# Patient Record
Sex: FEMALE | Race: WHITE
[De-identification: names, ages, dates, MRNs, and addresses within clinical notes are randomized per-mention and may not be internally consistent; named-entity substitution may affect disease eponyms.]

---

## 2021-09-15 ENCOUNTER — HOSPITAL ENCOUNTER (EMERGENCY)
Dept: HOSPITAL 41 - JD.ED | Age: 76
Discharge: NURSING, REHAB OR LONG-TERM CARE | End: 2021-09-15
Payer: MEDICARE

## 2021-09-15 DIAGNOSIS — K56.2: Primary | ICD-10-CM

## 2021-09-15 DIAGNOSIS — Z88.1: ICD-10-CM

## 2021-09-15 DIAGNOSIS — Z88.0: ICD-10-CM

## 2021-09-15 DIAGNOSIS — Z20.822: ICD-10-CM

## 2021-09-15 DIAGNOSIS — Z79.82: ICD-10-CM

## 2021-09-15 DIAGNOSIS — Z79.899: ICD-10-CM

## 2021-09-15 PROCEDURE — U0002 COVID-19 LAB TEST NON-CDC: HCPCS

## 2021-09-15 NOTE — EDM.PDOC
ED HPI GENERAL MEDICAL PROBLEM





- General


Chief Complaint: Abdominal Pain


Stated Complaint: [facility]


Time Seen by Provider: 09/15/21 09:08


Source of Information: Reports: Patient, EMS


History Limitations: Reports: No Limitations





- History of Present Illness


INITIAL COMMENTS - FREE TEXT/NARRATIVE: 





The patient presents by [facility] with intercept by [facility] 

for lower abdominal pain.  The patient said she woke up feeling fine.  She went 

to the bathroom and started having dysuria and then she developed lower 

abdominal pain.  She has no nausea or vomiting.  She has some diarrhea but that 

is normal for her.  She has no fever, chills, cough, chest pain, or shortness of

breath.  She still has her appendix and gallbladder.  EMS gave her 25mcg IV of f

entanyl.


Onset: Sudden


Duration: Hour(s):


Location: Reports: Abdomen


Quality: Reports: Sharp


Severity: Moderate


Improves with: Reports: None


Worsens with: Reports: None


Associated Symptoms: Reports: No Other Symptoms


  ** Bilateral Lower Abdomen


Pain Score (Numeric/FACES): 6





- Related Data


                                    Allergies











Allergy/AdvReac Type Severity Reaction Status Date / Time


 


cephalexin [From Keflex] Allergy Severe Facial Verified 09/15/21 09:19





   Swelling  


 


Penicillins Allergy Intermediate Hives Verified 09/15/21 11:35











Home Meds: 


                                    Home Meds





Aspirin [Halfprin] 81 mg PO QAM 09/15/21 [History]


Budesonide [Budesonide EC] 9 mg PO QAM 09/15/21 [History]


Calcium Carbonate [Calcium] 500 mg PO QAM 09/15/21 [History]


Cholecalciferol (Vitamin D3) [Vitamin D3] 6,000 units PO QAM 09/15/21 [History]


Latanoprost 1 drop EYEBOTH QA 09/15/21 [History]


Olmesartan Medoxomil [Benicar] 20 mg PO QAM 09/15/21 [History]


Simvastatin 40 mg PO QPM 09/15/21 [History]


Timolol [Betimol] 1 drop EYEBOTH QA 09/15/21 [History]


Vitamin B Complex 1 cap PO QAM 09/15/21 [History]











ED ROS GENERAL





- Review of Systems


Review Of Systems: See Below


Constitutional: Reports: No Symptoms


HEENT: Reports: No Symptoms


Respiratory: Reports: No Symptoms


Cardiovascular: Reports: No Symptoms


Endocrine: Reports: No Symptoms


GI/Abdominal: Reports: Abdominal Pain, Diarrhea.  Denies: Nausea, Vomiting


: Reports: No Symptoms


Musculoskeletal: Reports: No Symptoms





ED EXAM, GI/ABD





- Physical Exam


Exam: See Below


Exam Limited By: No Limitations


General Appearance: Alert, No Apparent Distress


Ears: Normal External Exam


Nose: Normal Inspection


Head: Atraumatic, Normocephalic


Neck: Normal Inspection


Respiratory/Chest: No Respiratory Distress, Lungs Clear, Normal Breath Sounds


Cardiovascular: Regular Rate, Rhythm, No Edema, No Murmur


GI/Abdominal Exam: Soft, No Organomegaly, No Mass, Tender (Moderate generalized 

tenderness)





Course





- Vital Signs


Last Recorded V/S: 


                                Last Vital Signs











Temp  96.9 F   09/15/21 09:10


 


Pulse  73   09/15/21 09:10


 


Resp  18   09/15/21 09:10


 


BP  177/85 H  09/15/21 09:10


 


Pulse Ox  98   09/15/21 09:10














- Orders/Labs/Meds


Orders: 


                               Active Orders 24 hr











 Category Date Time Status


 


 Peripheral IV Care [RC] .AS DIRECTED Care  09/15/21 09:17 Active


 


 CORONAVIRUS COVID-19 KATHI [MOLEC] Stat Lab  09/15/21 12:09 Ordered


 


 CULTURE URINE [MREF] Stat Lab  09/15/21 11:50 Ordered


 


 Levofloxacin/Dextrose 5%-Water [Levaquin in D5W 500 MG/ Med  09/15/21 12:00 

Active





 100 ML] 500 mg   





 Premix Bag 1 bag   





 IV ONETIME   


 


 Sodium Chloride 0.9% [Normal Saline] 1,000 ml Med  09/15/21 09:30 Active





 IV ASDIRECTED   


 


 Sodium Chloride 0.9% [Saline Flush] Med  09/15/21 09:17 Active





 10 ml FLUSH ASDIRECTED PRN   


 


 Sodium Chloride 0.9% [Saline Flush] Med  09/15/21 09:26 Active





 10 ml FLUSH ONETIME PRN   


 


 metroNIDAZOLE/Normal Saline [Flagyl in  MG/100 ML Med  09/15/21 12:00 

Active





 ] 500 mg   





 Premix Bag 1 bag   





 IV ONETIME   


 


 ED Antiemetic Medication Reflex [OM.PC] Stat Oth  09/15/21 09:17 Ordered


 


 Peripheral IV Insertion Adult [OM.PC] Stat Oth  09/15/21 09:17 Ordered








                                Medication Orders





Sodium Chloride (Normal Saline)  1,000 mls @ 125 mls/hr IV ASDIRECTED LIZANDRO


   Last Admin: 09/15/21 09:30  Dose: 125 mls/hr


   Documented by: SANTOSH


Metronidazole 500 mg/ Premix  100 mls @ 100 mls/hr IV ONETIME ONE


   Stop: 09/15/21 12:59


Levofloxacin/Dextrose 500 mg/ (Premix)  100 mls @ 100 mls/hr IV ONETIME ONE


   Stop: 09/15/21 12:59


Sodium Chloride (Sodium Chloride 0.9% 10 Ml Syringe)  10 ml FLUSH ASDIRECTED PRN


   PRN Reason: Keep Vein Open


   Last Admin: 09/15/21 09:31  Dose: 10 ml


   Documented by: ERNESTO


Sodium Chloride (Sodium Chloride 0.9% 10 Ml Syringe)  10 ml FLUSH ONETIME PRN


   PRN Reason: IV FLUSH


   Last Admin: 09/15/21 11:09  Dose: 10 ml


   Documented by: MILAGROS








Labs: 


                                Laboratory Tests











  09/15/21 09/15/21 09/15/21 Range/Units





  09:45 10:20 10:20 


 


WBC   6.85   (3.98-10.04)  K/mm3


 


RBC   4.13   (3.98-5.22)  M/mm3


 


Hgb   13.3   (11.2-15.7)  gm/dl


 


Hct   41.3   (34.1-44.9)  %


 


MCV   100.0 H   (79.4-94.8)  fl


 


MCH   32.2   (25.6-32.2)  pg


 


MCHC   32.2   (32.2-35.5)  g/dl


 


RDW Std Deviation   54.3 H   (36.4-46.3)  fL


 


Plt Count   241   (182-369)  K/mm3


 


MPV   9.1 L   (9.4-12.3)  fl


 


Neut % (Auto)   82.5 H   (34.0-71.1)  %


 


Lymph % (Auto)   14.5 L   (19.3-51.7)  %


 


Mono % (Auto)   2.6 L   (4.7-12.5)  %


 


Eos % (Auto)   0.3 L   (0.7-5.8)  


 


Baso % (Auto)   0.0 L   (0.1-1.2)  %


 


Neut # (Auto)   5.65   (1.56-6.13)  K/mm3


 


Lymph # (Auto)   0.99 L   (1.18-3.74)  K/mm3


 


Mono # (Auto)   0.18 L   (0.24-0.36)  K/mm3


 


Eos # (Auto)   0.02 L   (0.04-0.36)  K/mm3


 


Baso # (Auto)   0.00 L   (0.01-0.08)  K/mm3


 


Sodium    139  (136-145)  mEq/L


 


Potassium    3.1 L  (3.5-5.1)  mEq/L


 


Chloride    105  ()  mEq/L


 


Carbon Dioxide    20 L  (21-32)  mEq/L


 


Anion Gap    17.1 H  (5-15)  


 


BUN    16  (7-18)  mg/dL


 


Creatinine    0.7  (0.55-1.02)  mg/dL


 


Est Cr Clr Drug Dosing    53.21  mL/min


 


Estimated GFR (MDRD)    > 60  (>60)  mL/min


 


BUN/Creatinine Ratio    22.9 H  (14-18)  


 


Glucose    95  (70-99)  mg/dL


 


Calcium    8.7  (8.5-10.1)  mg/dL


 


Total Bilirubin    0.7  (0.2-1.0)  mg/dL


 


AST    24  (15-37)  U/L


 


ALT    45  (14-59)  U/L


 


Alkaline Phosphatase    73  ()  U/L


 


Total Protein    6.6  (6.4-8.2)  g/dl


 


Albumin    3.6  (3.4-5.0)  g/dl


 


Globulin    3.0  gm/dL


 


Albumin/Globulin Ratio    1.2  (1-2)  


 


Lipase    93  ()  U/L


 


Urine Color  Zenia H    (Yellow)  


 


Urine Appearance  Cloudy H    (Clear)  


 


Urine pH  6.0    (5.0-8.0)  


 


Ur Specific Gravity  1.020    (1.005-1.030)  


 


Urine Protein  2+ H    (Negative)  


 


Urine Glucose (UA)  Negative    (Negative)  


 


Urine Ketones  Trace H    (Negative)  


 


Urine Occult Blood  Trace-intact H    (Negative)  


 


Urine Nitrite  Positive H    (Negative)  


 


Urine Bilirubin  1+ H    (Negative)  


 


Urine Urobilinogen  1.0    (0.2-1.0)  


 


Ur Leukocyte Esterase  2+ H    (Negative)  


 


Urine RBC  0-5    (0-5)  /hpf


 


Urine WBC  >100 H    (0-5)  /hpf


 


Ur Epithelial Cells  0-5    (0-5)  /hpf


 


Urine Bacteria  Few    (FEW)  /hpf


 


Urine Mucus  Few    (FEW)  /hpf











Meds: 


Medications











Generic Name Dose Route Start Last Admin





  Trade Name Leah  PRN Reason Stop Dose Admin


 


Sodium Chloride  1,000 mls @ 125 mls/hr  09/15/21 09:30  09/15/21 09:30





  Normal Saline  IV   125 mls/hr





  ASDIRECTED LIZANDRO   Administration


 


Metronidazole 500 mg/ Premix  100 mls @ 100 mls/hr  09/15/21 12:00 





  IV  09/15/21 12:59 





  ONETIME ONE  


 


Levofloxacin/Dextrose 500 mg/  100 mls @ 100 mls/hr  09/15/21 12:00 





  Premix  IV  09/15/21 12:59 





  ONETIME ONE  


 


Sodium Chloride  10 ml  09/15/21 09:17  09/15/21 09:31





  Sodium Chloride 0.9% 10 Ml Syringe  FLUSH   10 ml





  ASDIRECTED PRN   Administration





  Keep Vein Open  


 


Sodium Chloride  10 ml  09/15/21 09:26  09/15/21 11:09





  Sodium Chloride 0.9% 10 Ml Syringe  FLUSH   10 ml





  ONETIME PRN   Administration





  IV FLUSH  














Discontinued Medications














Generic Name Dose Route Start Last Admin





  Trade Name Leah  PRN Reason Stop Dose Admin


 


Diatrizoate Meglum/Diatrizoate Sod  120 ml  09/15/21 09:26  09/15/21 11:09





  Diatrizoate Meglumine/Diatrizoate Sodium 37% 120 Ml Bottle  PO  09/15/21 09:27

  30 ml





  ONETIME ONE   Administration


 


Hydromorphone HCl  0.5 mg  09/15/21 09:18  09/15/21 09:31





  Hydromorphone 0.5 Mg/0.5 Ml Syringe  IVPUSH  09/15/21 09:19  0.5 mg





  ONETIME ONE   Administration


 


Hydromorphone HCl  0.5 mg  09/15/21 12:07 





  Hydromorphone 0.5 Mg/0.5 Ml Syringe  IVPUSH  09/15/21 12:08 





  ONETIME ONE  


 


Iopamidol  100 ml  09/15/21 09:26  09/15/21 11:09





  Iopamidol 612 Mg/Ml 100 Ml Bottle  IVPUSH  09/15/21 09:27  100 ml





  ONETIME ONE   Administration


 


Ondansetron HCl  4 mg  09/15/21 09:17  09/15/21 09:31





  Ondansetron 4 Mg/2 Ml Sdv  IVPUSH  09/15/21 09:18  4 mg





  ONETIME ONE   Administration














- Re-Assessments/Exams


Free Text/Narrative Re-Assessment/Exam: 





09/15/21 10:00


I ordered an IV NS at 125mL/hr, zofran 4mg IV, dilaudid 0.5mg IV, labs, UA and a

 CT of her abdomen and pelvis with IV and oral contrast.


09/15/21 11:54


Her CBC looks good.  Her K was low at 3.1.  Her anion gap was elevated at 17.1. 

 Her lipase was negative.  Her UA shows a UTI.


09/15/21 12:09


Her CT shows small amount of free air underneath the right hemidiaphragm.  Gas 

dilated cecum suspicious for cecal volvulus.  This may be the etiology of the 

free air.  Dilated small bowel loops are seen.  Diffuse wall thickening is seen 

within the duodenum and jejunum which could be vascular in etiology versus 

change from infection.





I am told we have no beds right now and cannot accept the patient.  I called [facility] in [place] and talked with Dr Joaquin the on call surgeon and she 

accepted the patient.  She did want levaquin and flagyl started.  I have ordered

 them.





Departure





- Departure


Time of Disposition: 12:20


Disposition: DC/[facility]fer to [facility]


Condition: Serious


Clinical Impression: 


 Cecal volvulus, Perforated abdominal viscus








- Discharge Information


Referrals: 


Ninfa Dodson MD [Primary Care Provider] - 


Forms:  ED Department Discharge





Sepsis Event Note (ED)





- Evaluation


Sepsis Screening Result: No Definite Risk





- Focused Exam


Vital Signs: 


                                   Vital Signs











  Temp Pulse Resp BP Pulse Ox


 


 09/15/21 09:10  96.9 F  73  18  177/85 H  98














- My Orders


Last 24 Hours: 


My Active Orders





09/15/21 09:17


Peripheral IV Care [RC] .AS DIRECTED 


Sodium Chloride 0.9% [Saline Flush]   10 ml FLUSH ASDIRECTED PRN 


ED Antiemetic Medication Reflex [OM.PC] Stat 


Peripheral IV Insertion Adult [OM.PC] Stat 





09/15/21 09:26


Sodium Chloride 0.9% [Saline Flush]   10 ml FLUSH ONETIME PRN 





09/15/21 09:30


Sodium Chloride 0.9% [Normal Saline] 1,000 ml IV ASDIRECTED 





09/15/21 11:50


CULTURE URINE [MREF] Stat 





09/15/21 12:00


Levofloxacin/Dextrose 5%-Water [Levaquin in D5W 500 MG/100 ML] 500 mg   Premix 

Bag 1 bag IV ONETIME 


metroNIDAZOLE/Normal Saline [Flagyl in  MG/100 ML] 500 mg   Premix Bag 1 

bag IV ONETIME 





09/15/21 12:09


CORONAVIRUS COVID-19 KATHI [MOLEC] Stat 














- Assessment/Plan


Last 24 Hours: 


My Active Orders





09/15/21 09:17


Peripheral IV Care [RC] .AS DIRECTED 


Sodium Chloride 0.9% [Saline Flush]   10 ml FLUSH ASDIRECTED PRN 


ED Antiemetic Medication Reflex [OM.PC] Stat 


Peripheral IV Insertion Adult [OM.PC] Stat 





09/15/21 09:26


Sodium Chloride 0.9% [Saline Flush]   10 ml FLUSH ONETIME PRN 





09/15/21 09:30


Sodium Chloride 0.9% [Normal Saline] 1,000 ml IV ASDIRECTED 





09/15/21 11:50


CULTURE URINE [MREF] Stat 





09/15/21 12:00


Levofloxacin/Dextrose 5%-Water [Levaquin in D5W 500 MG/100 ML] 500 mg   Premix 

Bag 1 bag IV ONETIME 


metroNIDAZOLE/Normal Saline [Flagyl in  MG/100 ML] 500 mg   Premix Bag 1 

bag IV ONETIME 





09/15/21 12:09


CORONAVIRUS COVID-19 KATHI [MOLEC] Stat

## 2021-09-15 NOTE — CT
CT abdomen and pelvis

 

Technique: Multiple axial sections were obtained from above the dome 

of the diaphragm inferiorly through the pubic symphysis.  Intravenous 

and oral contrast has been given.  Delayed images were obtained 

through the pelvis.  Reconstructed coronal and sagittal images were 

obtained.

 

Comparison: No prior CT abdomen or pelvis study is available.

 

Findings: There is a mild amount of free air being seen beneath the 

hemidiaphragm on the right side.

 

Visualized lung bases show nothing acute.

 

Liver contains no focal parenchymal abnormality.  Contrast is noted 

within the distal esophagus compatible with reflux.  Spleen size is 

normal.  Adrenal glands show no nodule.  Kidneys show symmetric 

contrast enhancement.  Very small low density finding is noted within 

the left kidney measuring 5 mm which is most likely due to a small 

cyst.  Abdominal aorta shows atherosclerotic change with no aneurysm. 

 There is opacification of the celiac axis and superior mesenteric 

arteries without any evidence of focal occlusion.  Inferior mesenteric

 artery is also patent.  No retroperitoneal adenopathy is seen.  No 

pelvic mass or adenopathy is seen.

 

Pancreas appears within normal limits.

 

There is prominent bowel wall thickening being seen within the 

duodenum and jejunum.  More distal jejunum and ileum show mild 

dilatation.  There is air being seen within the wall of the thickened 

jejunum compatible with disruption of the bowel wall.

 

Cecum lies within the right upper abdomen.  Slight inflammatory change

 is seen.  Findings are suggestive of a mild cecal volvulus.  Cecum is

 gas-filled and dilated up to 8.3 cm.  Appendix is not visualized.  

There may be perforation of the cecum given the free air.

 

Diffuse diverticuli are seen throughout the colon without findings of 

diverticulitis.

 

Delayed images show contrast within the distal ureters and bladder.  

Right hip pinning is noted.  Mild degenerative change is seen within 

the spine most prominent at L3-4.

 

Impression:

1.  Small amount of free air underneath the right hemidiaphragm.

2.  Gas dilated cecum suspicious for cecal volvulus.  This may be the 

etiology of the free air.

3.  Dilated small bowel loops are seen.  Diffuse wall thickening is 

seen within the duodenum and jejunum which could be vascular in 

etiology versus change from infection.

 

Diagnostic code #5

## 2022-01-07 ENCOUNTER — HOSPITAL ENCOUNTER (INPATIENT)
Dept: HOSPITAL 41 - JD.MS | Age: 77
LOS: 3 days | Discharge: HOME | DRG: 641 | End: 2022-01-10
Attending: SURGERY | Admitting: SURGERY
Payer: MEDICARE

## 2022-01-07 DIAGNOSIS — N17.9: ICD-10-CM

## 2022-01-07 DIAGNOSIS — E86.0: Primary | ICD-10-CM

## 2022-01-07 DIAGNOSIS — Z87.891: ICD-10-CM

## 2022-01-07 DIAGNOSIS — Z88.0: ICD-10-CM

## 2022-01-07 DIAGNOSIS — Z93.3: ICD-10-CM

## 2022-01-07 DIAGNOSIS — Z98.51: ICD-10-CM

## 2022-01-07 DIAGNOSIS — Z20.822: ICD-10-CM

## 2022-01-07 DIAGNOSIS — F32.A: ICD-10-CM

## 2022-01-07 DIAGNOSIS — K57.90: ICD-10-CM

## 2022-01-07 DIAGNOSIS — H40.9: ICD-10-CM

## 2022-01-07 DIAGNOSIS — Z98.49: ICD-10-CM

## 2022-01-07 DIAGNOSIS — Z88.1: ICD-10-CM

## 2022-01-07 DIAGNOSIS — Z79.82: ICD-10-CM

## 2022-01-07 DIAGNOSIS — Z79.899: ICD-10-CM

## 2022-01-07 PROCEDURE — U0002 COVID-19 LAB TEST NON-CDC: HCPCS

## 2022-01-07 NOTE — PCM.HP.2
H&P History of Present Illness





- General


Date of Service: 01/07/22


Admit Problem/Dx: 


                           Admission Diagnosis/Problem





Admission Diagnosis/Problem      Dehydration








Source of Information: Patient


History Limitations: Reports: No Limitations





- History of Present Illness


Initial Comments - Free Text/Narative: 





The patient was seen in my office today for a routine visit for colostomy rever

sal. She complained that she has been fatigues. [place] 

I obtained CBC and chemistry. She reported that her colostomy output has been 

liquid for a few days. No fevers or chills.


CMP : Glu 151, BUN 37, Creat 2.35, Na 132, K 5.2, Cl 102, CO2 15, AG 22, Calc 

11.3, Tprot 7.9, Alb 4.8, Ast 66, ALT 86, Tbili 0.6.


CBC: WBC 10.2, Hgb 16.5, Plat 125





Looking back in Sept 30, 2021, her creat was 0.66.





Due to DAYANARA and dehydration i asked that she be admitted for hydration.





Onset of Symptoms: Reports: Gradual


Duration of Symptoms: Reports: Day(s): (7)


Location: Reports: Abdomen


Improves with: Reports: None


Worsens with: Reports: None


Context: Reports: Sick Contact


Associated Symptoms: Reports: Loss of Appetite





- Related Data


Allergies/Adverse Reactions: 


                                    Allergies











Allergy/AdvReac Type Severity Reaction Status Date / Time


 


cephalexin [From Keflex] Allergy Severe Facial Verified 09/15/21 09:19





   Swelling  


 


Penicillins Allergy Intermediate Hives Verified 09/15/21 11:35











Home Medications: 


                                    Home Meds





Aspirin [Halfprin] 81 mg PO QAM 09/15/21 [History]


Budesonide [Budesonide EC] 9 mg PO QAM 09/15/21 [History]


Calcium Carbonate [Calcium] 500 mg PO QAM 09/15/21 [History]


Cholecalciferol (Vitamin D3) [Vitamin D3] 6,000 units PO QAM 09/15/21 [History]


Latanoprost 1 drop EYEBOTH QAM 09/15/21 [History]


Olmesartan Medoxomil [Benicar] 20 mg PO QAM 09/15/21 [History]


Simvastatin 40 mg PO QPM 09/15/21 [History]


Timolol [Betimol 0.25% O/S 5 ML] 1 drop EYEBOTH QAM 09/15/21 [History]


Vitamin B Complex 1 cap PO [facility] 09/15/21 [History]











Past Medical History


HEENT History: Reports: Cataract, Glaucoma


Gastrointestinal History: Reports: Diverticulosis, Inflammatory Bowel Disease


Musculoskeletal History: Reports: None


Psychiatric History: Reports: Depression


Other Psychiatric History: Hx of it years ago


Hematologic History: Reports: Anemia





- Infectious Disease History


Infectious Disease History: Reports: Chicken Pox, Measles





- Past Surgical History


HEENT Surgical History: Reports: Cataract Surgery, Laser Surgery


Other HEENT Surgeries/Procedures: Cataract and glaucoma surgeries [id number]


GI Surgical History: Reports: Colostomy


Other GI Surgeries/Procedures: Ostomy placed Sept 2021


Female  Surgical History: Reports: Tubal Ligation


Other Female  Surgeries/Procedures: 30 plus years ago


Other Musculoskeletal Surgeries/Procedures:: Right femur fracture with brigitte 

placement 2015





Social & Family History





- Family History


HEENT: Reports: None


Cardiac: Reports: Hypertension


Other Cardiac Family History: Mother had multiple stroke and HTN. Father had HTN





- Tobacco Use


Tobacco Use Status *Q: Former Tobacco User


Years of Tobacco use: 30


Used Tobacco, but Quit: Yes


Month/Year Tobacco Last Used: 6 yrs ago


Second Hand Smoke Exposure: No





- Caffeine Use


Caffeine Use: Reports: Coffee





- Alcohol Use


Days Per Week of Alcohol Use: 7


Number of Drinks Per Day: 1


Total Drinks Per Week: 7


Date of Last Drink: 01/06/22


Time of Last Drink: 21:00





- Recreational Drug Use


Recreational Drug Use: No





H&P Review of Systems





- Review of Systems:


Review Of Systems: See Below


General: Reports: No Symptoms


HEENT: Reports: No Symptoms


Pulmonary: Reports: No Symptoms


Cardiovascular: Reports: No Symptoms


Gastrointestinal: Reports: No Symptoms


Genitourinary: Reports: No Symptoms


Musculoskeletal: Reports: No Symptoms


Skin: Reports: No Symptoms


Psychiatric: Reports: No Symptoms


Review of Systems Comment:: 





Fatigue





Exam





- Exam


Exam: See Below





- Vital Signs


Vital Signs: 


                                Last Vital Signs











Temp  97.5 F   01/07/22 15:46


 


Pulse  102 H  01/07/22 15:46


 


Resp  12   01/07/22 15:46


 


BP  120/40 L  01/07/22 15:46


 


Pulse Ox  99   01/07/22 15:46











Weight: 60.6 kg





- Exam


General: Alert, Oriented, Cooperative


Lungs: Clear to Auscultation, Normal Respiratory Effort


Cardiovascular: Regular Rhythm, Normal S1, Normal S2, Tachycardia


GI/Abdominal Exam: Normal Bowel Sounds, Soft, Non-Tender





Sepsis Event Note





- Focused Exam


Vital Signs: 


                                   Vital Signs











  Temp Pulse Resp BP Pulse Ox


 


 01/07/22 15:46  97.5 F  102 H  12  120/40 L  99











Problem List Initiated/Reviewed/Updated: No


Orders Last 24hrs: 


                               Active Orders 24 hr











 Category Date Time Status


 


 Patient Status [ADT] Routine ADT  01/07/22 16:18 Ordered


 


 Antiembolic Devices [RC] PER UNIT ROUTINE Care  01/07/22 16:30 Ordered


 


 Intake and Output [RC] Q4HR Care  01/07/22 16:20 Ordered


 


 Oxygen Therapy [RC] PRN Care  01/07/22 16:18 Ordered


 


 Up ad Vianney [RC] ASDIRECTED Care  01/07/22 16:18 Ordered


 


 VTE/DVT Education [RC] PER UNIT ROUTINE Care  01/07/22 16:18 Ordered


 


 Vital Signs [RC] Q4H Care  01/07/22 16:18 Ordered


 


 Regular Diet [DIET] Diet  01/07/22 Dinner Ordered


 


 BASIC METABOLIC PANEL,BMP [CHEM] AM Lab  01/08/22 05:11 Ordered


 


 BASIC METABOLIC PANEL,BMP [CHEM] AM Lab  01/09/22 05:11 Ordered


 


 BASIC METABOLIC PANEL,BMP [CHEM] AM Lab  01/10/22 05:11 Ordered


 


 BASIC METABOLIC PANEL,BMP [CHEM] AM Lab  01/11/22 05:11 Ordered


 


 BASIC METABOLIC PANEL,BMP [CHEM] AM Lab  01/12/22 05:11 Ordered


 


 CBC WITH AUTO DIFF [HEME] AM Lab  01/08/22 05:11 Ordered


 


 CBC WITH AUTO DIFF [HEME] AM Lab  01/09/22 05:11 Ordered


 


 CBC WITH AUTO DIFF [HEME] AM Lab  01/10/22 05:11 Ordered


 


 CORONAVIRUS COVID-19 KATHI [MOLEC] Stat Lab  01/07/22 16:25 Ordered


 


 MAGNESIUM [CHEM] AM Lab  01/08/22 05:11 Ordered


 


 MAGNESIUM [CHEM] AM Lab  01/09/22 05:11 Ordered


 


 MAGNESIUM [CHEM] AM Lab  01/10/22 05:11 Ordered


 


 MAGNESIUM [CHEM] AM Lab  01/11/22 05:11 Ordered


 


 MAGNESIUM [CHEM] AM Lab  01/12/22 05:11 Ordered


 


 PHOSPHORUS [CHEM] AM Lab  01/08/22 05:11 Ordered


 


 PHOSPHORUS [CHEM] AM Lab  01/09/22 05:11 Ordered


 


 PHOSPHORUS [CHEM] AM Lab  01/10/22 05:11 Ordered


 


 PHOSPHORUS [CHEM] AM Lab  01/11/22 05:11 Ordered


 


 PHOSPHORUS [CHEM] AM Lab  01/12/22 05:11 Ordered


 


 Acetaminophen [TylenoL] Med  01/07/22 16:25 Ordered





 650 mg PO Q4H PRN   


 


 Docusate Sodium [Colace] Med  01/07/22 16:25 Ordered





 100 mg PO BID PRN   


 


 Enoxaparin [Lovenox] Med  01/08/22 09:00 Ordered





 40 mg SUBCUT DAILY   


 


 Ondansetron [Zofran ODT] Med  01/07/22 16:25 Ordered





 4 mg PO Q4H PRN   


 


 Sodium Chloride 0.9% [Normal Saline] 1,000 ml Med  01/07/22 16:30 Ordered





 IV ASDIRECTED   


 


 Sodium Chloride 0.9% [Normal Saline] 1,000 ml Med  01/07/22 16:45 Ordered





 IV ASDIRECTED   


 


 Sequential Compression Device [OM.PC] Per Unit Routine Oth  01/07/22 16:20 

Ordered


 


 Resuscitation Status Routine Resus Stat  01/07/22 16:18 Ordered








                                Medication Orders





Acetaminophen (Acetaminophen 325 Mg Tab)  650 mg PO Q4H PRN


   PRN Reason: Pain (Mild 1-3)/fever


Docusate Sodium (Docusate Sodium 100 Mg Cap)  100 mg PO BID PRN


   PRN Reason: Constipation


Enoxaparin Sodium (Enoxaparin 40 Mg/0.4 Ml Syringe)  40 mg SUBCUT DAILY LIZANDRO


Sodium Chloride (Normal Saline)  1,000 mls @ 150 mls/hr IV ASDIRECTED LIZANDRO


Sodium Chloride (Normal Saline)  1,000 mls @ 500 mls/hr IV ASDIRECTED LIZANDRO


   Stop: 01/11/22 16:34


Ondansetron HCl (Ondansetron 4 Mg Tab.Dis)  4 mg PO Q4H PRN


   PRN Reason: nausea, able to take PO








Assessment/Plan Comment:: 





Patient has DAYANARA due to severe dehydration likely due to high colostomy output.





- 1L NS bolus


- IVF at 150cc/hr


- Ok to eat reg diet


- ok to ambulate


- will monitor UOP and colostomy output





- Mortality Measure


Prognosis:: Good (Single organ failure)

## 2022-01-08 RX ADMIN — TIMOLOL MALEATE SCH DROP: 2.5 SOLUTION OPHTHALMIC at 09:05

## 2022-01-08 NOTE — PCM.PN
- General Info


Date of Service: 01/08/22


Admission Dx/Problem (Free Text): 


                           Admission Diagnosis/Problem





Admission Diagnosis/Problem      Dehydration








Subjective Update: 





Patient is feeling a lit better. Her headache has resolved. Her heart rate is 

back to normal. No nausea or vomiting. Stool output is still watery. She 

urinated today for the first time in about a day. She denies any burning with 

urination. Feels weak


Functional Status: Reports: Pain Controlled, Tolerating Diet, Ambulating, 

Urinating





- Review of Systems


General: Reports: No Symptoms


HEENT: Reports: No Symptoms


Pulmonary: Reports: No Symptoms


Cardiovascular: Reports: No Symptoms


Gastrointestinal: Reports: No Symptoms


Genitourinary: Reports: No Symptoms


Musculoskeletal: Reports: No Symptoms


Skin: Reports: No Symptoms





- Patient Data


Vitals - Most Recent: 


                                Last Vital Signs











Temp  98.2 F   01/08/22 04:12


 


Pulse  77   01/08/22 04:12


 


Resp  14   01/08/22 04:12


 


BP  123/87   01/08/22 08:53


 


Pulse Ox  98   01/08/22 04:12











Weight - Most Recent: 61.19 kg


I&O - Last 24 Hours: 


                                 Intake & Output











 01/07/22 01/08/22 01/08/22





 22:59 06:59 14:59


 


Intake Total 180 2527 


 


Output Total  1125 


 


Balance 180 1402 











Lab Results Last 24 Hours: 


                         Laboratory Results - last 24 hr











  01/07/22 01/08/22 01/08/22 Range/Units





  17:30 05:30 05:30 


 


WBC   7.00   (3.98-10.04)  K/mm3


 


RBC   4.17   (3.98-5.22)  M/mm3


 


Hgb   13.2   (11.2-15.7)  gm/dl


 


Hct   40.5   (34.1-44.9)  %


 


MCV   97.1 H   (79.4-94.8)  fl


 


MCH   31.7   (25.6-32.2)  pg


 


MCHC   32.6   (32.2-35.5)  g/dl


 


RDW Std Deviation   47.2 H   (36.4-46.3)  fL


 


Plt Count   102 L D   (182-369)  K/mm3


 


MPV   10.0   (9.4-12.3)  fl


 


Neut % (Auto)   43.3   (34.0-71.1)  %


 


Lymph % (Auto)   44.3   (19.3-51.7)  %


 


Mono % (Auto)   10.0   (4.7-12.5)  %


 


Eos % (Auto)   1.7   (0.7-5.8)  


 


Baso % (Auto)   0.6   (0.1-1.2)  %


 


Neut # (Auto)   3.03   (1.56-6.13)  K/mm3


 


Lymph # (Auto)   3.10   (1.18-3.74)  K/mm3


 


Mono # (Auto)   0.70 H   (0.24-0.36)  K/mm3


 


Eos # (Auto)   0.12   (0.04-0.36)  K/mm3


 


Baso # (Auto)   0.04   (0.01-0.08)  K/mm3


 


Sodium    139  (136-145)  mEq/L


 


Potassium    3.7  (3.5-5.1)  mEq/L


 


Chloride    107  ()  mEq/L


 


Carbon Dioxide    18 L  (21-32)  mEq/L


 


Anion Gap    17.7 H  (5-15)  


 


BUN    34 H  (7-18)  mg/dL


 


Creatinine    1.8 H  (0.55-1.02)  mg/dL


 


Est Cr Clr Drug Dosing    20.69  mL/min


 


Estimated GFR (MDRD)    28  (>60)  mL/min


 


BUN/Creatinine Ratio    18.9 H  (14-18)  


 


Glucose    89  (70-99)  mg/dL


 


Calcium    8.5  (8.5-10.1)  mg/dL


 


Phosphorus    4.2  (2.6-4.7)  mg/dL


 


Magnesium    1.9  (1.8-2.4)  mg/dL


 


SARS-CoV-2 RNA (KATHI)  Negative    (NEGATIVE)  











Med Orders - Current: 


                               Current Medications





Acetaminophen (Acetaminophen 325 Mg Tab)  650 mg PO Q4H PRN


   PRN Reason: Pain (Mild 1-3)/fever


   Last Admin: 01/07/22 17:22 Dose:  650 mg


   Documented by: 


Aspirin (Aspirin 81 Mg Tab.Ec)  81 mg PO QAM [facility]


   Last Admin: 01/08/22 08:52 Dose:  81 mg


   Documented by: 


Docusate Sodium (Docusate Sodium 100 Mg Cap)  100 mg PO BID PRN


   PRN Reason: Constipation


   Last Admin: 01/07/22 17:22 Dose:  100 mg


   Documented by: 


Enoxaparin Sodium (Enoxaparin 40 Mg/0.4 Ml Syringe)  40 mg SUBCUT DAILY [facility]


   Last Admin: 01/08/22 08:55 Dose:  40 mg


   Documented by: 


Sodium Chloride (Normal Saline)  1,000 mls @ 150 mls/hr IV ASDIRECTED [facility]


   Last Admin: 01/08/22 09:35 Dose:  150 mls/hr


   Documented by: 


Latanoprost (Latanoprost 0.005% Ophth Soln 2.5 Ml Bottle)  0 ml EYEBOTH BEDTIME 

[facility]


   Last Admin: 01/07/22 21:20 Dose:  1 drop


   Documented by: 


Losartan Potassium (Losartan 50 Mg Tab)  50 mg PO DAILY [facility]


   Last Admin: 01/08/22 08:53 Dose:  50 mg


   Documented by: 


Non-Formulary Medication (Budesonide [Budesonide Ec])  9 mg PO QAM [facility]


Ondansetron HCl (Ondansetron 4 Mg Tab.Dis)  4 mg PO Q4H PRN


   PRN Reason: nausea, able to take PO


   Last Admin: 01/07/22 17:22 Dose:  4 mg


   Documented by: 


Simvastatin (Simvastatin 40 Mg Tab)  40 mg PO BEDTIME [facility]


   Last Admin: 01/07/22 21:20 Dose:  40 mg


   Documented by: 


Timolol Maleate (Timolol Maleate 0.25% Ophth Soln 5 Ml Bottle)  0 ml EYEBOTH QAM

[facility]


   Last Admin: 01/08/22 09:05 Dose:  1 drop


   Documented by: 





Discontinued Medications





Sodium Chloride (Normal Saline)  1,000 mls @ 500 mls/hr IV ASDIRECTED [facility]


   Stop: 01/11/22 16:34


   Last Admin: 01/07/22 17:50 Dose:  500 mls/hr


   Documented by: 











- Exam


Quality Assessment: DVT Prophylaxis


General: Alert, Oriented, Cooperative


Lungs: Normal Respiratory Effort


Cardiovascular: Regular Rate, Regular Rhythm


GI/Abdominal Exam: Soft, Non-Tender, Other (watery colostomy output)





- Patient Data


Lab Results Last 24 hrs: 


                         Laboratory Results - last 24 hr











  01/07/22 01/08/22 01/08/22 Range/Units





  17:30 05:30 05:30 


 


WBC   7.00   (3.98-10.04)  K/mm3


 


RBC   4.17   (3.98-5.22)  M/mm3


 


Hgb   13.2   (11.2-15.7)  gm/dl


 


Hct   40.5   (34.1-44.9)  %


 


MCV   97.1 H   (79.4-94.8)  fl


 


MCH   31.7   (25.6-32.2)  pg


 


MCHC   32.6   (32.2-35.5)  g/dl


 


RDW Std Deviation   47.2 H   (36.4-46.3)  fL


 


Plt Count   102 L D   (182-369)  K/mm3


 


MPV   10.0   (9.4-12.3)  fl


 


Neut % (Auto)   43.3   (34.0-71.1)  %


 


Lymph % (Auto)   44.3   (19.3-51.7)  %


 


Mono % (Auto)   10.0   (4.7-12.5)  %


 


Eos % (Auto)   1.7   (0.7-5.8)  


 


Baso % (Auto)   0.6   (0.1-1.2)  %


 


Neut # (Auto)   3.03   (1.56-6.13)  K/mm3


 


Lymph # (Auto)   3.10   (1.18-3.74)  K/mm3


 


Mono # (Auto)   0.70 H   (0.24-0.36)  K/mm3


 


Eos # (Auto)   0.12   (0.04-0.36)  K/mm3


 


Baso # (Auto)   0.04   (0.01-0.08)  K/mm3


 


Sodium    139  (136-145)  mEq/L


 


Potassium    3.7  (3.5-5.1)  mEq/L


 


Chloride    107  ()  mEq/L


 


Carbon Dioxide    18 L  (21-32)  mEq/L


 


Anion Gap    17.7 H  (5-15)  


 


BUN    34 H  (7-18)  mg/dL


 


Creatinine    1.8 H  (0.55-1.02)  mg/dL


 


Est Cr Clr Drug Dosing    20.69  mL/min


 


Estimated GFR (MDRD)    28  (>60)  mL/min


 


BUN/Creatinine Ratio    18.9 H  (14-18)  


 


Glucose    89  (70-99)  mg/dL


 


Calcium    8.5  (8.5-10.1)  mg/dL


 


Phosphorus    4.2  (2.6-4.7)  mg/dL


 


Magnesium    1.9  (1.8-2.4)  mg/dL


 


SARS-CoV-2 RNA (KATHI)  Negative    (NEGATIVE)  











Result Diagrams: 


                                 01/08/22 05:30





                                 01/08/22 05:30





Sepsis Event Note





- Evaluation


Sepsis Screening Result: No Definite Risk





- Focused Exam


Vital Signs: 


                                   Vital Signs











  Temp Pulse Resp BP BP Pulse Ox


 


 01/08/22 08:53     123/87  


 


 01/08/22 04:12  98.2 F  77  14   106/65  98














- Problem List Review


Problem List Initiated/Reviewed/Updated: No





- My Orders


Last 24 Hours: 


My Active Orders





01/07/22 16:18


Oxygen Therapy [RC] PRN 


Up ad Vianney [RC] ASDIRECTED 


VTE/DVT Education [RC] PER UNIT ROUTINE 


Vital Signs [RC] 10,16,22,04 


Resuscitation Status Routine 





01/07/22 16:20


Intake and Output [RC] 04,16 


Sequential Compression Device [OM.PC] Per Unit Routine 





01/07/22 16:25


Acetaminophen [TylenoL]   650 mg PO Q4H PRN 


Docusate Sodium [Colace]   100 mg PO BID PRN 


Ondansetron [Zofran ODT]   4 mg PO Q4H PRN 





01/07/22 16:30


Antiembolic Devices [RC] PER UNIT ROUTINE 


Sodium Chloride 0.9% [Normal Saline] 1,000 ml IV ASDIRECTED 





01/07/22 Dinner


Regular Diet [DIET] 





01/07/22 20:40


Patient Status [ADT] Routine 





01/07/22 21:00


Latanoprost [Xalatan 0.005% Ophth Soln]   0 ml EYEBOTH BEDTIME 


Simvastatin [Zocor]   40 mg PO BEDTIME 





01/08/22 08:00


Aspirin [Halfprin]   81 mg PO QAM 


Budesonide [Budesonide EC]   9 mg PO QAM 


timoloL maleate [Timoptic 0.25% Ophth Soln]   0 ml EYEBOTH QAM 





01/08/22 09:00


Enoxaparin [Lovenox]   40 mg SUBCUT DAILY 


Losartan [Cozaar]   50 mg PO DAILY 





01/08/22 10:42


calcium polycarbophiL [Fibercon]   1,250 mg PO TID 





01/09/22 05:11


BASIC METABOLIC PANEL,BMP [CHEM] AM 


CBC WITH AUTO DIFF [HEME] AM 


MAGNESIUM [CHEM] AM 


PHOSPHORUS [CHEM] AM 





01/10/22 05:11


BASIC METABOLIC PANEL,BMP [CHEM] AM 


CBC WITH AUTO DIFF [HEME] AM 


MAGNESIUM [CHEM] AM 


PHOSPHORUS [CHEM] AM 





01/11/22 05:11


BASIC METABOLIC PANEL,BMP [CHEM] AM 


MAGNESIUM [CHEM] AM 


PHOSPHORUS [CHEM] AM 





01/12/22 05:11


BASIC METABOLIC PANEL,BMP [CHEM] AM 


MAGNESIUM [CHEM] AM 


PHOSPHORUS [CHEM] AM 














- Assessment


Assessment:: 





HD1 for CANDIDA due to severe dehydration secondary to high ileostomy output. She 

denies any illness or any recent antibiotics therapy. Her Candida is resolving. 

Electrolytes are normalizing





- Plan


Plan:: 





Plan


- continue IVF at 150cc/hr for until 6PM then reduce the rate to 125cc/hr


- continue to encourage PO intake


- Will start fibercorn supplementation today to see if we can thicken colostomy 

output


- Encourage ambulation


- I discussed with the patient that she will need to drink plenty of fluids at 

home to keep up with fluid losses. She understands.

## 2022-01-09 RX ADMIN — TIMOLOL MALEATE SCH DROP: 2.5 SOLUTION OPHTHALMIC at 09:00

## 2022-01-09 NOTE — PCM.PN
- General Info


Date of Service: 01/09/22


Admission Dx/Problem (Free Text): 


                           Admission Diagnosis/Problem





Admission Diagnosis/Problem      Dehydration








Subjective Update: 





Patient is doing better now. No nausea or vomiting. She is still fatigued but 

better.


Functional Status: Reports: Pain Controlled, Tolerating Diet, Ambulating, 

Urinating





- Review of Systems


General: Reports: No Symptoms


HEENT: Reports: No Symptoms


Pulmonary: Reports: No Symptoms


Cardiovascular: Reports: No Symptoms


Gastrointestinal: Reports: No Symptoms


Genitourinary: Reports: No Symptoms


Musculoskeletal: Reports: No Symptoms





- Patient Data


Vitals - Most Recent: 


                                Last Vital Signs











Temp  98.8 F   01/09/22 01:40


 


Pulse  70   01/09/22 01:40


 


Resp  14   01/09/22 01:40


 


BP  124/67   01/09/22 09:34


 


Pulse Ox  98   01/09/22 01:40











Weight - Most Recent: 65.408 kg


I&O - Last 24 Hours: 


                                 Intake & Output











 01/08/22 01/09/22 01/09/22





 22:59 06:59 14:59


 


Intake Total 2980 1650 


 


Output Total 1050 1550 


 


Balance 1930 100 











Lab Results Last 24 Hours: 


                         Laboratory Results - last 24 hr











  01/09/22 01/09/22 Range/Units





  05:57 05:57 


 


WBC  4.87   (3.98-10.04)  K/mm3


 


RBC  3.80 L   (3.98-5.22)  M/mm3


 


Hgb  11.7  D   (11.2-15.7)  gm/dl


 


Hct  37.6   (34.1-44.9)  %


 


MCV  98.9 H   (79.4-94.8)  fl


 


MCH  30.8   (25.6-32.2)  pg


 


MCHC  31.1 L   (32.2-35.5)  g/dl


 


RDW Std Deviation  48.0 H   (36.4-46.3)  fL


 


Plt Count  88 L   (182-369)  K/mm3


 


MPV  9.8   (9.4-12.3)  fl


 


Neut % (Auto)  36.7   (34.0-71.1)  %


 


Lymph % (Auto)  49.3   (19.3-51.7)  %


 


Mono % (Auto)  10.7   (4.7-12.5)  %


 


Eos % (Auto)  2.5   (0.7-5.8)  


 


Baso % (Auto)  0.6   (0.1-1.2)  %


 


Neut # (Auto)  1.79   (1.56-6.13)  K/mm3


 


Lymph # (Auto)  2.40   (1.18-3.74)  K/mm3


 


Mono # (Auto)  0.52 H   (0.24-0.36)  K/mm3


 


Eos # (Auto)  0.12   (0.04-0.36)  K/mm3


 


Baso # (Auto)  0.03   (0.01-0.08)  K/mm3


 


Manual Slide Review  Normal smear   


 


Sodium   143  (136-145)  mEq/L


 


Potassium   4.1  (3.5-5.1)  mEq/L


 


Chloride   113 H  ()  mEq/L


 


Carbon Dioxide   18 L  (21-32)  mEq/L


 


Anion Gap   16.1 H  (5-15)  


 


BUN   16  (7-18)  mg/dL


 


Creatinine   0.8  (0.55-1.02)  mg/dL


 


Est Cr Clr Drug Dosing   46.55  mL/min


 


Estimated GFR (MDRD)   > 60  (>60)  mL/min


 


BUN/Creatinine Ratio   20.0 H  (14-18)  


 


Glucose   79  (70-99)  mg/dL


 


Calcium   8.4 L  (8.5-10.1)  mg/dL


 


Phosphorus   3.2  (2.6-4.7)  mg/dL


 


Magnesium   1.8  (1.8-2.4)  mg/dL











Med Orders - Current: 


                               Current Medications





Acetaminophen (Acetaminophen 325 Mg Tab)  650 mg PO Q4H PRN


   PRN Reason: Pain (Mild 1-3)/fever


   Last Admin: 01/08/22 21:01 Dose:  650 mg


   Documented by: 


Al Hydroxide/Mg Hydroxide (Aluminum Hydroxide/Magnesium Hydroxide/Simethicone 

Susp 30 Ml Cup)  30 ml PO Q4H PRN


   PRN Reason: Heartburn


   Last Admin: 01/09/22 01:45 Dose:  30 ml


   Documented by: 


Aspirin (Aspirin 81 Mg Tab.Ec)  81 mg PO QAM [facility]


   Last Admin: 01/09/22 09:37 Dose:  81 mg


   Documented by: 


Calcium Polycarbophil (Calcium Polycarbophil 625 Mg Tab)  1,250 mg PO TID [facility]


   Last Admin: 01/09/22 09:37 Dose:  1,250 mg


   Documented by: 


Diphenoxylate HCl/Atropine (Atropine/Diphenoxylate 0.025-2.5 Mg Tab)  1 tab PO 

Q8H [facility]


Docusate Sodium (Docusate Sodium 100 Mg Cap)  100 mg PO BID PRN


   PRN Reason: Constipation


   Last Admin: 01/07/22 17:22 Dose:  100 mg


   Documented by: 


Enoxaparin Sodium (Enoxaparin 40 Mg/0.4 Ml Syringe)  40 mg SUBCUT DAILY [facility]


   Last Admin: 01/08/22 08:55 Dose:  40 mg


   Documented by: 


Latanoprost (Latanoprost 0.005% Ophth Soln 2.5 Ml Bottle)  0 ml EYEBOTH BEDTIME 

[facility]


   Last Admin: 01/08/22 20:59 Dose:  1 drop


   Documented by: 


Losartan Potassium (Losartan 50 Mg Tab)  50 mg PO DAILY [facility]


   Last Admin: 01/09/22 09:34 Dose:  50 mg


   Documented by: 


Non-Formulary Medication (Budesonide [Budesonide Ec])  9 mg PO QAM [facility]


Ondansetron HCl (Ondansetron 4 Mg Tab.Dis)  4 mg PO Q4H PRN


   PRN Reason: nausea, able to take PO


   Last Admin: 01/07/22 17:22 Dose:  4 mg


   Documented by: 


Simvastatin (Simvastatin 40 Mg Tab)  40 mg PO BEDTIME [facility]


   Last Admin: 01/08/22 21:01 Dose:  40 mg


   Documented by: 


Timolol Maleate (Timolol Maleate 0.25% Ophth Soln 5 Ml Bottle)  0 ml EYEBOTH QAM

[facility]


   Last Admin: 01/08/22 09:05 Dose:  1 drop


   Documented by: 





Discontinued Medications





Sodium Chloride (Normal Saline)  1,000 mls @ 150 mls/hr IV ASDIRECTED [facility]


   Last Admin: 01/08/22 17:02 Dose:  150 mls/hr


   Documented by: 


Sodium Chloride (Normal Saline)  1,000 mls @ 500 mls/hr IV ASDIRECTED [facility]


   Stop: 01/11/22 16:34


   Last Admin: 01/07/22 17:50 Dose:  500 mls/hr


   Documented by: 


Sodium Chloride (Normal Saline)  1,000 mls @ 125 mls/hr IV ASDIRECTED [facility]


   Last Admin: 01/09/22 09:34 Dose:  125 mls/hr


   Documented by: 











- Exam


General: Alert, Oriented, Cooperative


Cardiovascular: Regular Rate, Regular Rhythm


GI/Abdominal Exam: Soft, Non-Tender, No Distention, Other (Colostomy output is 

still watery today.)





- Patient Data


Lab Results Last 24 hrs: 


                         Laboratory Results - last 24 hr











  01/09/22 01/09/22 Range/Units





  05:57 05:57 


 


WBC  4.87   (3.98-10.04)  K/mm3


 


RBC  3.80 L   (3.98-5.22)  M/mm3


 


Hgb  11.7  D   (11.2-15.7)  gm/dl


 


Hct  37.6   (34.1-44.9)  %


 


MCV  98.9 H   (79.4-94.8)  fl


 


MCH  30.8   (25.6-32.2)  pg


 


MCHC  31.1 L   (32.2-35.5)  g/dl


 


RDW Std Deviation  48.0 H   (36.4-46.3)  fL


 


Plt Count  88 L   (182-369)  K/mm3


 


MPV  9.8   (9.4-12.3)  fl


 


Neut % (Auto)  36.7   (34.0-71.1)  %


 


Lymph % (Auto)  49.3   (19.3-51.7)  %


 


Mono % (Auto)  10.7   (4.7-12.5)  %


 


Eos % (Auto)  2.5   (0.7-5.8)  


 


Baso % (Auto)  0.6   (0.1-1.2)  %


 


Neut # (Auto)  1.79   (1.56-6.13)  K/mm3


 


Lymph # (Auto)  2.40   (1.18-3.74)  K/mm3


 


Mono # (Auto)  0.52 H   (0.24-0.36)  K/mm3


 


Eos # (Auto)  0.12   (0.04-0.36)  K/mm3


 


Baso # (Auto)  0.03   (0.01-0.08)  K/mm3


 


Manual Slide Review  Normal smear   


 


Sodium   143  (136-145)  mEq/L


 


Potassium   4.1  (3.5-5.1)  mEq/L


 


Chloride   113 H  ()  mEq/L


 


Carbon Dioxide   18 L  (21-32)  mEq/L


 


Anion Gap   16.1 H  (5-15)  


 


BUN   16  (7-18)  mg/dL


 


Creatinine   0.8  (0.55-1.02)  mg/dL


 


Est Cr Clr Drug Dosing   46.55  mL/min


 


Estimated GFR (MDRD)   > 60  (>60)  mL/min


 


BUN/Creatinine Ratio   20.0 H  (14-18)  


 


Glucose   79  (70-99)  mg/dL


 


Calcium   8.4 L  (8.5-10.1)  mg/dL


 


Phosphorus   3.2  (2.6-4.7)  mg/dL


 


Magnesium   1.8  (1.8-2.4)  mg/dL











Result Diagrams: 


                                 01/09/22 05:57





                                 01/09/22 05:57





Sepsis Event Note





- Evaluation


Sepsis Screening Result: No Definite Risk





- Focused Exam


Vital Signs: 


                                   Vital Signs











  Temp Pulse Resp BP Pulse Ox


 


 01/09/22 09:34     124/67 


 


 01/09/22 01:40  98.8 F  70  14  107/68  98














- Problem List Review


Problem List Initiated/Reviewed/Updated: No





- My Orders


Last 24 Hours: 


My Active Orders





01/08/22 10:42


calcium polycarbophiL [Fibercon]   1,250 mg PO TID 





01/09/22 10:30


Atropine/Diphenoxylate [Lomotil 0.025-2.5 MG]   1 tab PO Q8H 





01/10/22 05:11


BASIC METABOLIC PANEL,BMP [CHEM] AM 


CBC WITH AUTO DIFF [HEME] AM 


MAGNESIUM [CHEM] AM 


PHOSPHORUS [CHEM] AM 





01/11/22 05:11


BASIC METABOLIC PANEL,BMP [CHEM] AM 


MAGNESIUM [CHEM] AM 


PHOSPHORUS [CHEM] AM 





01/12/22 05:11


BASIC METABOLIC PANEL,BMP [CHEM] AM 


MAGNESIUM [CHEM] AM 


PHOSPHORUS [CHEM] AM 














- Assessment


Assessment:: 





HD2 for DAYANARA due to severe dehydration secondary to high ileostomy output. She 

denies any illness or any recent antibiotics therapy. Her DAYANARA has now resolved. 

Electrolytes are essentially normal.





- Plan


Plan:: 








She is now well hydrated but her colostomy output is still watery so she is 

prone to loosing a lot of fluid through that. We will try to reduce the output 

further.





Plan


- Encourage the patient to drink plenty of fluids, minimum 3L/day. can be any fl

uids 


- Will stop IVF today


- Increase fiber supplementation today


- Will start Lomotil 2.5 mg TID today





Will see how the patient does without IVF today and try to slow down colostomy 

output. If she can keep up with fluid losses, then she can be discharged 

tomorrow.





She understands the plan.

## 2022-01-10 RX ADMIN — TIMOLOL MALEATE SCH DROP: 2.5 SOLUTION OPHTHALMIC at 08:18

## 2022-01-10 NOTE — [id number]
**Discharge Summary





- Hospital Course


Free Text/Narrative:: 





Patient was admitted due to DAYANARA secondary to severe dehydration likely secondary

to increased colostomy output. She was rehydrated and her Dayanara resolved. She was 

started on fiber and Imodium to control her colostomy output and encouraged to 

take adequate fluids to offset fluid losses. Patient verbalized understanding. 

She was discharged home in stable condition. She will follow up with me in 

clinic in 2-3 weeks.


Diagnosis: Stroke: No





- Discharge Data


Discharge Date: 01/10/22


Discharge Disposition: Home, Self-Care 01


Condition: Good





- Referral to Home Health


Primary Care Physician: 


PCP Not In Area








- Patient Instructions


Diet: Heart Healthy Diet


Activity: As Tolerated


Driving: May Drive Today


Showering/Bathing: May Shower


Notify Provider of: Fever, Nausea and/or Vomiting


Other/Special Instructions: - If stools become too thick or if you go 24 hrs 

without stool output, hold the imodium.





- Discharge Plan


*PRESCRIPTION DRUG MONITORING PROGRAM REVIEWED*: Not Applicable


*COPY OF PRESCRIPTION DRUG MONITORING REPORT IN PATIENT GELA: Not Applicable


Prescriptions/Med Rec: 


calcium polycarbophiL [Fibercon] 1,250 mg PO Q6H 90 Days #240 tablet


Loperamide [Imodium] 2 mg PO Q6HR@0000,0600,1200,1800 90 Days #120 cap


Home Medications: 


                                    Home Meds





Aspirin [Halfprin] 81 mg PO QAM 09/15/21 [History]


Budesonide [Budesonide EC] 9 mg PO QAM 09/15/21 [History]


Calcium Carbonate [Calcium] 500 mg PO QAM 09/15/21 [History]


Cholecalciferol (Vitamin D3) [Vitamin D3] 6,000 units PO QAM 09/15/21 [History]


Latanoprost 1 drop EYEBOTH QPM 09/15/21 [History]


Olmesartan Medoxomil [Benicar] 20 mg PO QAM 09/15/21 [History]


Simvastatin 40 mg PO QPM 09/15/21 [History]


Timolol [Betimol 0.25% O/S 5 ML] 1 drop EYEBOTH QAM 09/15/21 [History]


Vitamin B Complex 1 cap PO QAM 09/15/21 [History]


Loperamide [Imodium] 2 mg PO Q6HR@0000,0600,1200,1800 90 Days #120 cap 01/10/22 

[Rx]


calcium polycarbophiL [Fibercon] 1,250 mg PO Q6H 90 Days #240 tablet 01/10/22 

[Rx]








Oxygen Therapy Mode: Room Air


Patient Handouts:  Colostomy Home Guide, Adult, Dehydration, Adult, Easy-to-Read


Referrals: 


Seb Edge MD [Physician] -  (At the end of January  Please call 

to make appointment)





- Discharge Summary/Plan Comment


DC Time >30 min.: Yes


Total # of Minutes for Discharge Time: 





35





- General Info


Date of Service: 01/10/22


Admission Dx/Problem (Free Text: 


                           Admission Diagnosis/Problem





Admission Diagnosis/Problem      Dehydration








Subjective Update: 





Patient is feeling well this AM. No nausea or vomiting. SHe was able to take 

close to 3L of PO yesterday. Colostomy output is now below 1L.


Functional Status: Reports: Pain Controlled, Tolerating Diet, Ambulating, 

Urinating





- Review of Systems


General: Reports: No Symptoms


HEENT: Reports: No Symptoms


Pulmonary: Reports: No Symptoms


Cardiovascular: Reports: No Symptoms


Gastrointestinal: Reports: No Symptoms


Genitourinary: Reports: No Symptoms


Musculoskeletal: Reports: No Symptoms





- Patient Data


Vitals - Most Recent: 


                                Last Vital Signs











Temp  98.6 F   01/10/22 08:22


 


Pulse  62   01/10/22 08:22


 


Resp  14   01/10/22 08:22


 


BP  145/64 H  01/10/22 09:32


 


Pulse Ox  100   01/10/22 08:22











Weight - Most Recent: 64.864 kg


I&O - Last 24 hours: 


                                 Intake & Output











 01/09/22 01/10/22 01/10/22





 22:59 06:59 14:59


 


Intake Total 1455 500 


 


Output Total 850 1300 


 


Balance 605 -800 











Lab Results - Last 24 hrs: 


                         Laboratory Results - last 24 hr











  01/10/22 01/10/22 Range/Units





  05:58 05:58 


 


WBC  6.24   (3.98-10.04)  K/mm3


 


RBC  3.88 L   (3.98-5.22)  M/mm3


 


Hgb  12.1   (11.2-15.7)  gm/dl


 


Hct  37.9   (34.1-44.9)  %


 


MCV  97.7 H   (79.4-94.8)  fl


 


MCH  31.2   (25.6-32.2)  pg


 


MCHC  31.9 L   (32.2-35.5)  g/dl


 


RDW Std Deviation  46.1   (36.4-46.3)  fL


 


Plt Count  85 L   (182-369)  K/mm3


 


MPV  10.0   (9.4-12.3)  fl


 


Neut % (Auto)  49.1   (34.0-71.1)  %


 


Lymph % (Auto)  36.5   (19.3-51.7)  %


 


Mono % (Auto)  10.7   (4.7-12.5)  %


 


Eos % (Auto)  3.0   (0.7-5.8)  


 


Baso % (Auto)  0.5   (0.1-1.2)  %


 


Neut # (Auto)  3.06   (1.56-6.13)  K/mm3


 


Lymph # (Auto)  2.28   (1.18-3.74)  K/mm3


 


Mono # (Auto)  0.67 H   (0.24-0.36)  K/mm3


 


Eos # (Auto)  0.19   (0.04-0.36)  K/mm3


 


Baso # (Auto)  0.03   (0.01-0.08)  K/mm3


 


Manual Slide Review  Abnormal smear   


 


Sodium   140  (136-145)  mEq/L


 


Potassium   3.9  (3.5-5.1)  mEq/L


 


Chloride   109 H  ()  mEq/L


 


Carbon Dioxide   20 L  (21-32)  mEq/L


 


Anion Gap   14.9  (5-15)  


 


BUN   7  (7-18)  mg/dL


 


Creatinine   0.7  (0.55-1.02)  mg/dL


 


Est Cr Clr Drug Dosing   53.21  mL/min


 


Estimated GFR (MDRD)   > 60  (>60)  mL/min


 


BUN/Creatinine Ratio   10.0 L  (14-18)  


 


Glucose   83  (70-99)  mg/dL


 


Calcium   9.0  (8.5-10.1)  mg/dL


 


Phosphorus   3.4  (2.6-4.7)  mg/dL


 


Magnesium   1.7 L  (1.8-2.4)  mg/dL











Med Orders - Current: 


                               Current Medications





Acetaminophen (Acetaminophen 325 Mg Tab)  650 mg PO Q4H PRN


   PRN Reason: Pain (Mild 1-3)/fever


   Last Admin: 01/08/22 21:01 Dose:  650 mg


   Documented by: 


Al Hydroxide/Mg Hydroxide (Aluminum Hydroxide/Magnesium Hydroxide/Simethicone 

Susp 30 Ml Cup)  30 ml PO Q4H PRN


   PRN Reason: Heartburn


   Last Admin: 01/09/22 01:45 Dose:  30 ml


   Documented by: 


Aspirin (Aspirin 81 Mg Tab.Ec)  81 mg PO QAM [facility]


   Last Admin: 01/10/22 08:17 Dose:  81 mg


   Documented by: 


Calcium Polycarbophil (Calcium Polycarbophil 625 Mg Tab)  1,250 mg PO QID [facility]


   Last Admin: 01/10/22 08:17 Dose:  1,250 mg


   Documented by: 


Docusate Sodium (Docusate Sodium 100 Mg Cap)  100 mg PO BID PRN


   PRN Reason: Constipation


   Last Admin: 01/07/22 17:22 Dose:  100 mg


   Documented by: 


Enoxaparin Sodium (Enoxaparin 40 Mg/0.4 Ml Syringe)  40 mg SUBCUT DAILY [facility]


   Last Admin: 01/10/22 12:24 Dose:  Not Given


   Documented by: 


Latanoprost (Latanoprost 0.005% Ophth Soln 2.5 Ml Bottle)  0 ml EYEBOTH BEDTIME 

[facility]


   Last Admin: 01/09/22 20:46 Dose:  1 drop


   Documented by: 


Loperamide HCl (Loperamide 2 Mg Cap)  2 mg PO Q6HR@0000,0600,1200,1800 [facility]


   Last Admin: 01/10/22 05:21 Dose:  2 mg


   Documented by: 


Losartan Potassium (Losartan 50 Mg Tab)  50 mg PO DAILY [facility]


   Last Admin: 01/10/22 09:32 Dose:  50 mg


   Documented by: 


Ondansetron HCl (Ondansetron 4 Mg Tab.Dis)  4 mg PO Q4H PRN


   PRN Reason: nausea, able to take PO


   Last Admin: 01/07/22 17:22 Dose:  4 mg


   Documented by: 


Simvastatin (Simvastatin 40 Mg Tab)  40 mg PO BEDTIME [facility]


   Last Admin: 01/09/22 20:46 Dose:  40 mg


   Documented by: 


Timolol Maleate (Timolol Maleate 0.25% Ophth Soln 5 Ml Bottle)  0 ml EYEBOTH QAM

 [facility]


   Last Admin: 01/10/22 08:18 Dose:  1 drop


   Documented by: 





Discontinued Medications





Calcium Polycarbophil (Calcium Polycarbophil 625 Mg Tab)  1,250 mg PO TID [facility]


   Last Admin: 01/09/22 09:37 Dose:  1,250 mg


   Documented by: 


Diphenoxylate HCl/Atropine (Atropine/Diphenoxylate 0.025-2.5 Mg Tab)  1 tab PO 

Q8HR@0600,1400,2200 [facility]


Sodium Chloride (Normal Saline)  1,000 mls @ 150 mls/hr IV ASDIRECTED [facility]


   Last Admin: 01/08/22 17:02 Dose:  150 mls/hr


   Documented by: 


Sodium Chloride (Normal Saline)  1,000 mls @ 500 mls/hr IV ASDIRECTED [facility]


   Stop: 01/11/22 16:34


   Last Admin: 01/07/22 17:50 Dose:  500 mls/hr


   Documented by: 


Sodium Chloride (Normal Saline)  1,000 mls @ 125 mls/hr IV ASDIRECTED [facility]


   Last Admin: 01/09/22 09:34 Dose:  125 mls/hr


   Documented by: 


Magnesium Oxide (Magnesium Oxide 400 Mg Tab)  800 mg PO ONETIME ONE


   Stop: 01/10/22 09:01


   Last Admin: 01/10/22 08:17 Dose:  800 mg


   Documented by: 


Non-Formulary Medication (Budesonide [Budesonide Ec])  9 mg PO QAM [facility]











- Exam


General: Reports: Alert, Oriented, Cooperative


Lungs: Reports: Normal Respiratory Effort


Cardiovascular: Reports: Regular Rate, Regular Rhythm


GI/Abdominal Exam: Soft, Non-Tender, No Distention, Other (colostomy output is 

loose but not watery)

## 2022-01-10 NOTE — PCM.PN
- General Info


Date of Service: 01/10/22


Admission Dx/Problem (Free Text): 


                           Admission Diagnosis/Problem





Admission Diagnosis/Problem      Dehydration








Subjective Update: 





Patient is feeling well this AM. No nausea or vomiting. SHe was able to take 

close to 3L of PO yesterday. Colostomy output is now below 1L.


Functional Status: Reports: Pain Controlled, Tolerating Diet, Ambulating, 

Urinating





- Review of Systems


General: Reports: No Symptoms


HEENT: Reports: No Symptoms


Pulmonary: Reports: No Symptoms


Cardiovascular: Reports: No Symptoms


Gastrointestinal: Reports: No Symptoms


Genitourinary: Reports: No Symptoms





- Patient Data


Vitals - Most Recent: 


                                Last Vital Signs











Temp  97.5 F   01/10/22 03:41


 


Pulse  66   01/10/22 03:41


 


Resp  16   01/10/22 03:41


 


BP  143/73 H  01/10/22 03:41


 


Pulse Ox  98   01/10/22 03:41











Weight - Most Recent: 65.136 kg


I&O - Last 24 Hours: 


                                 Intake & Output











 01/09/22 01/10/22 01/10/22





 22:59 06:59 14:59


 


Intake Total 1280 500 


 


Output Total 850 1300 


 


Balance 430 -800 











Lab Results Last 24 Hours: 


                         Laboratory Results - last 24 hr











  01/10/22 01/10/22 Range/Units





  05:58 05:58 


 


WBC  6.24   (3.98-10.04)  K/mm3


 


RBC  3.88 L   (3.98-5.22)  M/mm3


 


Hgb  12.1   (11.2-15.7)  gm/dl


 


Hct  37.9   (34.1-44.9)  %


 


MCV  97.7 H   (79.4-94.8)  fl


 


MCH  31.2   (25.6-32.2)  pg


 


MCHC  31.9 L   (32.2-35.5)  g/dl


 


RDW Std Deviation  46.1   (36.4-46.3)  fL


 


Plt Count  85 L   (182-369)  K/mm3


 


MPV  10.0   (9.4-12.3)  fl


 


Neut % (Auto)  49.1   (34.0-71.1)  %


 


Lymph % (Auto)  36.5   (19.3-51.7)  %


 


Mono % (Auto)  10.7   (4.7-12.5)  %


 


Eos % (Auto)  3.0   (0.7-5.8)  


 


Baso % (Auto)  0.5   (0.1-1.2)  %


 


Neut # (Auto)  3.06   (1.56-6.13)  K/mm3


 


Lymph # (Auto)  2.28   (1.18-3.74)  K/mm3


 


Mono # (Auto)  0.67 H   (0.24-0.36)  K/mm3


 


Eos # (Auto)  0.19   (0.04-0.36)  K/mm3


 


Baso # (Auto)  0.03   (0.01-0.08)  K/mm3


 


Manual Slide Review  Abnormal smear   


 


Sodium   140  (136-145)  mEq/L


 


Potassium   3.9  (3.5-5.1)  mEq/L


 


Chloride   109 H  ()  mEq/L


 


Carbon Dioxide   20 L  (21-32)  mEq/L


 


Anion Gap   14.9  (5-15)  


 


BUN   7  (7-18)  mg/dL


 


Creatinine   0.7  (0.55-1.02)  mg/dL


 


Est Cr Clr Drug Dosing   53.21  mL/min


 


Estimated GFR (MDRD)   > 60  (>60)  mL/min


 


BUN/Creatinine Ratio   10.0 L  (14-18)  


 


Glucose   83  (70-99)  mg/dL


 


Calcium   9.0  (8.5-10.1)  mg/dL


 


Phosphorus   3.4  (2.6-4.7)  mg/dL


 


Magnesium   1.7 L  (1.8-2.4)  mg/dL











Med Orders - Current: 


                               Current Medications





Acetaminophen (Acetaminophen 325 Mg Tab)  650 mg PO Q4H PRN


   PRN Reason: Pain (Mild 1-3)/fever


   Last Admin: 01/08/22 21:01 Dose:  650 mg


   Documented by: 


Al Hydroxide/Mg Hydroxide (Aluminum Hydroxide/Magnesium Hydroxide/Simethicone 

Susp 30 Ml Cup)  30 ml PO Q4H PRN


   PRN Reason: Heartburn


   Last Admin: 01/09/22 01:45 Dose:  30 ml


   Documented by: 


Aspirin (Aspirin 81 Mg Tab.Ec)  81 mg PO QAM [facility]


   Last Admin: 01/09/22 09:37 Dose:  81 mg


   Documented by: 


Calcium Polycarbophil (Calcium Polycarbophil 625 Mg Tab)  1,250 mg PO QID [facility]


   Last Admin: 01/09/22 20:46 Dose:  1,250 mg


   Documented by: 


Docusate Sodium (Docusate Sodium 100 Mg Cap)  100 mg PO BID PRN


   PRN Reason: Constipation


   Last Admin: 01/07/22 17:22 Dose:  100 mg


   Documented by: 


Enoxaparin Sodium (Enoxaparin 40 Mg/0.4 Ml Syringe)  40 mg SUBCUT DAILY [facility]


   Last Admin: 01/09/22 13:39 Dose:  Not Given


   Documented by: 


Latanoprost (Latanoprost 0.005% Ophth Soln 2.5 Ml Bottle)  0 ml EYEBOTH BEDTIME 

[facility]


   Last Admin: 01/09/22 20:46 Dose:  1 drop


   Documented by: 


Loperamide HCl (Loperamide 2 Mg Cap)  2 mg PO Q6HR@0000,0600,1200,1800 [facility]


   Last Admin: 01/10/22 05:21 Dose:  2 mg


   Documented by: 


Losartan Potassium (Losartan 50 Mg Tab)  50 mg PO DAILY [facility]


   Last Admin: 01/09/22 09:34 Dose:  50 mg


   Documented by: 


Ondansetron HCl (Ondansetron 4 Mg Tab.Dis)  4 mg PO Q4H PRN


   PRN Reason: nausea, able to take PO


   Last Admin: 01/07/22 17:22 Dose:  4 mg


   Documented by: 


Simvastatin (Simvastatin 40 Mg Tab)  40 mg PO BEDTIME [facility]


   Last Admin: 01/09/22 20:46 Dose:  40 mg


   Documented by: 


Timolol Maleate (Timolol Maleate 0.25% Ophth Soln 5 Ml Bottle)  0 ml EYEBOTH QAM

[facility]


   Last Admin: 01/09/22 09:00 Dose:  1 drop


   Documented by: 





Discontinued Medications





Calcium Polycarbophil (Calcium Polycarbophil 625 Mg Tab)  1,250 mg PO TID [facility]


   Last Admin: 01/09/22 09:37 Dose:  1,250 mg


   Documented by: 


Diphenoxylate HCl/Atropine (Atropine/Diphenoxylate 0.025-2.5 Mg Tab)  1 tab PO 

Q8HR@0600,1400,2200 [facility]


Sodium Chloride (Normal Saline)  1,000 mls @ 150 mls/hr IV ASDIRECTED [facility]


   Last Admin: 01/08/22 17:02 Dose:  150 mls/hr


   Documented by: 


Sodium Chloride (Normal Saline)  1,000 mls @ 500 mls/hr IV ASDIRECTED [facility]


   Stop: 01/11/22 16:34


   Last Admin: 01/07/22 17:50 Dose:  500 mls/hr


   Documented by: 


Sodium Chloride (Normal Saline)  1,000 mls @ 125 mls/hr IV ASDIRECTED [facility]


   Last Admin: 01/09/22 09:34 Dose:  125 mls/hr


   Documented by: 


Non-Formulary Medication (Budesonide [Budesonide Ec])  9 mg PO QAM [facility]











- Exam


General: Alert, Oriented, Cooperative


Lungs: Normal Respiratory Effort


Cardiovascular: Regular Rate, Regular Rhythm


GI/Abdominal Exam: Soft, Non-Tender, No Distention, Other (Colostomy output is a

little thickened, not watery)


Skin: Warm, Dry, Intact





- Patient Data


Lab Results Last 24 hrs: 


                         Laboratory Results - last 24 hr











  01/10/22 01/10/22 Range/Units





  05:58 05:58 


 


WBC  6.24   (3.98-10.04)  K/mm3


 


RBC  3.88 L   (3.98-5.22)  M/mm3


 


Hgb  12.1   (11.2-15.7)  gm/dl


 


Hct  37.9   (34.1-44.9)  %


 


MCV  97.7 H   (79.4-94.8)  fl


 


MCH  31.2   (25.6-32.2)  pg


 


MCHC  31.9 L   (32.2-35.5)  g/dl


 


RDW Std Deviation  46.1   (36.4-46.3)  fL


 


Plt Count  85 L   (182-369)  K/mm3


 


MPV  10.0   (9.4-12.3)  fl


 


Neut % (Auto)  49.1   (34.0-71.1)  %


 


Lymph % (Auto)  36.5   (19.3-51.7)  %


 


Mono % (Auto)  10.7   (4.7-12.5)  %


 


Eos % (Auto)  3.0   (0.7-5.8)  


 


Baso % (Auto)  0.5   (0.1-1.2)  %


 


Neut # (Auto)  3.06   (1.56-6.13)  K/mm3


 


Lymph # (Auto)  2.28   (1.18-3.74)  K/mm3


 


Mono # (Auto)  0.67 H   (0.24-0.36)  K/mm3


 


Eos # (Auto)  0.19   (0.04-0.36)  K/mm3


 


Baso # (Auto)  0.03   (0.01-0.08)  K/mm3


 


Manual Slide Review  Abnormal smear   


 


Sodium   140  (136-145)  mEq/L


 


Potassium   3.9  (3.5-5.1)  mEq/L


 


Chloride   109 H  ()  mEq/L


 


Carbon Dioxide   20 L  (21-32)  mEq/L


 


Anion Gap   14.9  (5-15)  


 


BUN   7  (7-18)  mg/dL


 


Creatinine   0.7  (0.55-1.02)  mg/dL


 


Est Cr Clr Drug Dosing   53.21  mL/min


 


Estimated GFR (MDRD)   > 60  (>60)  mL/min


 


BUN/Creatinine Ratio   10.0 L  (14-18)  


 


Glucose   83  (70-99)  mg/dL


 


Calcium   9.0  (8.5-10.1)  mg/dL


 


Phosphorus   3.4  (2.6-4.7)  mg/dL


 


Magnesium   1.7 L  (1.8-2.4)  mg/dL











Result Diagrams: 


                                 01/10/22 05:58





                                 01/10/22 05:58





Sepsis Event Note





- Evaluation


Sepsis Screening Result: No Definite Risk





- Focused Exam


Vital Signs: 


                                   Vital Signs











  Temp Pulse Resp BP Pulse Ox


 


 01/10/22 03:41  97.5 F  66  16  143/73 H  98


 


 01/09/22 20:38  99.3 F  70  16  114/73  99














- Problem List Review


Problem List Initiated/Reviewed/Updated: No





- My Orders


Last 24 Hours: 


My Active Orders





01/09/22 10:45


Loperamide [Imodium]   2 mg PO Q6HR@0000,0600,1200,1800 





01/09/22 13:00


calcium polycarbophiL [Fibercon]   1,250 mg PO QID 





01/11/22 05:11


BASIC METABOLIC PANEL,BMP [CHEM] AM 


MAGNESIUM [CHEM] AM 


PHOSPHORUS [CHEM] AM 





01/12/22 05:11


BASIC METABOLIC PANEL,BMP [CHEM] AM 


MAGNESIUM [CHEM] AM 


PHOSPHORUS [CHEM] AM 














- Assessment


Assessment:: 





HD3 for DAYANARA due to severe dehydration secondary to high ileostomy output. She 

denies any illness or any recent antibiotics therapy. Her DAYANARA has now resolved. 

Electrolytes are essentially normal.





- Plan


Plan:: 








She is now well hydrated. DAYANARA resolved. Stool output is < 1L





Plan


- replete Mag today


- will dc home today. patient will continue to take about 3L of fluids daily as 

she did in the past 24 hrs. Continue with current dose of imodium and fiber. 

Patient should follow up with me in clinic in 2-3 weeks.








Pt understands the plan.

## 2022-02-14 ENCOUNTER — HOSPITAL ENCOUNTER (OUTPATIENT)
Dept: HOSPITAL 41 - JD.SDS | Age: 77
Discharge: HOME | End: 2022-02-14
Attending: SURGERY
Payer: MEDICARE

## 2022-02-14 DIAGNOSIS — Z79.899: ICD-10-CM

## 2022-02-14 DIAGNOSIS — Z87.891: ICD-10-CM

## 2022-02-14 DIAGNOSIS — Z88.0: ICD-10-CM

## 2022-02-14 DIAGNOSIS — K62.89: ICD-10-CM

## 2022-02-14 DIAGNOSIS — Z43.3: Primary | ICD-10-CM

## 2022-02-14 DIAGNOSIS — E78.5: ICD-10-CM

## 2022-02-14 DIAGNOSIS — Z88.8: ICD-10-CM

## 2022-02-14 DIAGNOSIS — K57.30: ICD-10-CM

## 2022-02-14 DIAGNOSIS — Z98.890: ICD-10-CM

## 2022-02-14 DIAGNOSIS — I10: ICD-10-CM

## 2022-02-14 DIAGNOSIS — Z88.2: ICD-10-CM

## 2022-03-28 ENCOUNTER — HOSPITAL ENCOUNTER (INPATIENT)
Dept: HOSPITAL 41 - JD.MS | Age: 77
LOS: 8 days | Discharge: HOME | DRG: 330 | End: 2022-04-05
Attending: SURGERY | Admitting: SURGERY
Payer: MEDICARE

## 2022-03-28 DIAGNOSIS — Z20.822: ICD-10-CM

## 2022-03-28 DIAGNOSIS — I10: ICD-10-CM

## 2022-03-28 DIAGNOSIS — D64.9: ICD-10-CM

## 2022-03-28 DIAGNOSIS — T81.31XA: ICD-10-CM

## 2022-03-28 DIAGNOSIS — Z79.899: ICD-10-CM

## 2022-03-28 DIAGNOSIS — Z87.891: ICD-10-CM

## 2022-03-28 DIAGNOSIS — Z88.0: ICD-10-CM

## 2022-03-28 DIAGNOSIS — E78.00: ICD-10-CM

## 2022-03-28 DIAGNOSIS — Z43.3: Primary | ICD-10-CM

## 2022-03-28 DIAGNOSIS — F32.A: ICD-10-CM

## 2022-03-28 DIAGNOSIS — Z79.82: ICD-10-CM

## 2022-03-28 DIAGNOSIS — K57.90: ICD-10-CM

## 2022-03-28 DIAGNOSIS — R33.9: ICD-10-CM

## 2022-03-28 DIAGNOSIS — E78.5: ICD-10-CM

## 2022-03-28 PROCEDURE — 0WQF4ZZ REPAIR ABDOMINAL WALL, PERCUTANEOUS ENDOSCOPIC APPROACH: ICD-10-PCS | Performed by: SURGERY

## 2022-03-28 PROCEDURE — 0DBP4ZZ EXCISION OF RECTUM, PERCUTANEOUS ENDOSCOPIC APPROACH: ICD-10-PCS | Performed by: SURGERY

## 2022-03-28 PROCEDURE — 0DNU4ZZ RELEASE OMENTUM, PERCUTANEOUS ENDOSCOPIC APPROACH: ICD-10-PCS | Performed by: SURGERY

## 2022-03-28 PROCEDURE — 0DBN4ZZ EXCISION OF SIGMOID COLON, PERCUTANEOUS ENDOSCOPIC APPROACH: ICD-10-PCS | Performed by: SURGERY

## 2022-03-28 PROCEDURE — U0002 COVID-19 LAB TEST NON-CDC: HCPCS

## 2022-03-28 PROCEDURE — 0DJD8ZZ INSPECTION OF LOWER INTESTINAL TRACT, VIA NATURAL OR ARTIFICIAL OPENING ENDOSCOPIC: ICD-10-PCS | Performed by: SURGERY

## 2022-03-28 RX ADMIN — METRONIDAZOLE SCH MLS/HR: 500 SOLUTION INTRAVENOUS at 21:12

## 2022-03-28 RX ADMIN — Medication SCH: at 17:31

## 2022-03-29 RX ADMIN — METRONIDAZOLE SCH MLS/HR: 500 SOLUTION INTRAVENOUS at 05:14

## 2022-03-29 RX ADMIN — HYDROCODONE BITARTRATE AND ACETAMINOPHEN PRN TAB: 5; 325 TABLET ORAL at 07:55

## 2022-03-29 RX ADMIN — HYDROCODONE BITARTRATE AND ACETAMINOPHEN PRN TAB: 5; 325 TABLET ORAL at 20:27

## 2022-03-29 RX ADMIN — TIMOLOL MALEATE SCH DROP: 5 SOLUTION OPHTHALMIC at 09:16

## 2022-03-29 RX ADMIN — HYDROCODONE BITARTRATE AND ACETAMINOPHEN PRN TAB: 5; 325 TABLET ORAL at 03:00

## 2022-03-29 RX ADMIN — HYDROCODONE BITARTRATE AND ACETAMINOPHEN PRN TAB: 5; 325 TABLET ORAL at 11:54

## 2022-03-29 RX ADMIN — METRONIDAZOLE SCH MLS/HR: 500 SOLUTION INTRAVENOUS at 11:58

## 2022-03-29 RX ADMIN — HYDROCODONE BITARTRATE AND ACETAMINOPHEN PRN TAB: 5; 325 TABLET ORAL at 15:51

## 2022-03-30 RX ADMIN — HYDROCODONE BITARTRATE AND ACETAMINOPHEN PRN TAB: 5; 325 TABLET ORAL at 22:59

## 2022-03-30 RX ADMIN — HYDROCODONE BITARTRATE AND ACETAMINOPHEN PRN TAB: 5; 325 TABLET ORAL at 18:34

## 2022-03-30 RX ADMIN — HYDROCODONE BITARTRATE AND ACETAMINOPHEN PRN TAB: 5; 325 TABLET ORAL at 00:36

## 2022-03-30 RX ADMIN — HYDROCODONE BITARTRATE AND ACETAMINOPHEN PRN TAB: 5; 325 TABLET ORAL at 14:10

## 2022-03-30 RX ADMIN — HYDROCODONE BITARTRATE AND ACETAMINOPHEN PRN TAB: 5; 325 TABLET ORAL at 09:41

## 2022-03-30 RX ADMIN — HYDROCODONE BITARTRATE AND ACETAMINOPHEN PRN TAB: 5; 325 TABLET ORAL at 05:28

## 2022-03-30 RX ADMIN — TIMOLOL MALEATE SCH DROP: 5 SOLUTION OPHTHALMIC at 09:44

## 2022-03-31 RX ADMIN — TIMOLOL MALEATE SCH DROP: 5 SOLUTION OPHTHALMIC at 08:40

## 2022-03-31 RX ADMIN — HYDROCODONE BITARTRATE AND ACETAMINOPHEN PRN TAB: 5; 325 TABLET ORAL at 12:38

## 2022-03-31 RX ADMIN — HYDROCODONE BITARTRATE AND ACETAMINOPHEN PRN TAB: 5; 325 TABLET ORAL at 08:37

## 2022-03-31 RX ADMIN — HYDROCODONE BITARTRATE AND ACETAMINOPHEN PRN TAB: 5; 325 TABLET ORAL at 18:00

## 2022-03-31 RX ADMIN — HYDROCODONE BITARTRATE AND ACETAMINOPHEN PRN TAB: 5; 325 TABLET ORAL at 04:00

## 2022-04-01 PROCEDURE — 0JQ80ZZ REPAIR ABDOMEN SUBCUTANEOUS TISSUE AND FASCIA, OPEN APPROACH: ICD-10-PCS | Performed by: SURGERY

## 2022-04-01 RX ADMIN — HYDROCODONE BITARTRATE AND ACETAMINOPHEN PRN TAB: 5; 325 TABLET ORAL at 12:37

## 2022-04-01 RX ADMIN — HYDROCODONE BITARTRATE AND ACETAMINOPHEN PRN TAB: 5; 325 TABLET ORAL at 22:16

## 2022-04-01 RX ADMIN — TIMOLOL MALEATE SCH DROP: 5 SOLUTION OPHTHALMIC at 12:38

## 2022-04-01 RX ADMIN — HYDROCODONE BITARTRATE AND ACETAMINOPHEN PRN TAB: 5; 325 TABLET ORAL at 18:00

## 2022-04-01 RX ADMIN — HYDROCODONE BITARTRATE AND ACETAMINOPHEN PRN TAB: 5; 325 TABLET ORAL at 05:05

## 2022-04-01 RX ADMIN — LEVOFLOXACIN SCH: 750 INJECTION, SOLUTION INTRAVENOUS at 12:36

## 2022-04-02 RX ADMIN — LEVOFLOXACIN SCH MLS/HR: 750 INJECTION, SOLUTION INTRAVENOUS at 08:41

## 2022-04-02 RX ADMIN — HYDROCODONE BITARTRATE AND ACETAMINOPHEN PRN TAB: 5; 325 TABLET ORAL at 15:52

## 2022-04-02 RX ADMIN — HYDROCODONE BITARTRATE AND ACETAMINOPHEN PRN TAB: 5; 325 TABLET ORAL at 06:30

## 2022-04-02 RX ADMIN — TIMOLOL MALEATE SCH DROP: 5 SOLUTION OPHTHALMIC at 08:43

## 2022-04-02 RX ADMIN — HYDROCODONE BITARTRATE AND ACETAMINOPHEN PRN TAB: 5; 325 TABLET ORAL at 11:35

## 2022-04-02 RX ADMIN — HYDROCODONE BITARTRATE AND ACETAMINOPHEN PRN TAB: 5; 325 TABLET ORAL at 02:28

## 2022-04-02 RX ADMIN — HYDROCODONE BITARTRATE AND ACETAMINOPHEN PRN TAB: 5; 325 TABLET ORAL at 20:09

## 2022-04-03 RX ADMIN — LEVOFLOXACIN SCH MLS/HR: 750 INJECTION, SOLUTION INTRAVENOUS at 08:22

## 2022-04-03 RX ADMIN — HYDROCODONE BITARTRATE AND ACETAMINOPHEN PRN TAB: 5; 325 TABLET ORAL at 01:10

## 2022-04-03 RX ADMIN — TIMOLOL MALEATE SCH DROP: 5 SOLUTION OPHTHALMIC at 08:28

## 2022-04-03 RX ADMIN — HYDROCODONE BITARTRATE AND ACETAMINOPHEN PRN TAB: 5; 325 TABLET ORAL at 14:05

## 2022-04-03 RX ADMIN — HYDROCODONE BITARTRATE AND ACETAMINOPHEN PRN TAB: 5; 325 TABLET ORAL at 18:21

## 2022-04-03 RX ADMIN — HYDROCODONE BITARTRATE AND ACETAMINOPHEN PRN TAB: 5; 325 TABLET ORAL at 05:16

## 2022-04-03 RX ADMIN — HYDROCODONE BITARTRATE AND ACETAMINOPHEN PRN TAB: 5; 325 TABLET ORAL at 09:30

## 2022-04-04 RX ADMIN — HYDROCODONE BITARTRATE AND ACETAMINOPHEN PRN TAB: 5; 325 TABLET ORAL at 20:46

## 2022-04-04 RX ADMIN — HYDROCODONE BITARTRATE AND ACETAMINOPHEN PRN TAB: 5; 325 TABLET ORAL at 03:42

## 2022-04-04 RX ADMIN — HYDROCODONE BITARTRATE AND ACETAMINOPHEN PRN TAB: 5; 325 TABLET ORAL at 13:09

## 2022-04-04 RX ADMIN — TIMOLOL MALEATE SCH DROP: 5 SOLUTION OPHTHALMIC at 09:13

## 2022-04-04 RX ADMIN — HYDROCODONE BITARTRATE AND ACETAMINOPHEN PRN TAB: 5; 325 TABLET ORAL at 09:23

## 2022-04-05 RX ADMIN — TIMOLOL MALEATE SCH DROP: 5 SOLUTION OPHTHALMIC at 08:27

## 2022-04-05 RX ADMIN — HYDROCODONE BITARTRATE AND ACETAMINOPHEN PRN TAB: 5; 325 TABLET ORAL at 08:35

## 2022-04-05 RX ADMIN — HYDROCODONE BITARTRATE AND ACETAMINOPHEN PRN TAB: 5; 325 TABLET ORAL at 14:43
